# Patient Record
Sex: MALE | Race: BLACK OR AFRICAN AMERICAN | NOT HISPANIC OR LATINO | Employment: FULL TIME | URBAN - METROPOLITAN AREA
[De-identification: names, ages, dates, MRNs, and addresses within clinical notes are randomized per-mention and may not be internally consistent; named-entity substitution may affect disease eponyms.]

---

## 2020-03-13 ENCOUNTER — HOSPITAL ENCOUNTER (EMERGENCY)
Facility: HOSPITAL | Age: 50
Discharge: HOME | End: 2020-03-13
Attending: EMERGENCY MEDICINE
Payer: COMMERCIAL

## 2020-03-13 VITALS
BODY MASS INDEX: 30.74 KG/M2 | HEART RATE: 96 BPM | HEIGHT: 68 IN | WEIGHT: 202.81 LBS | TEMPERATURE: 98 F | DIASTOLIC BLOOD PRESSURE: 91 MMHG | OXYGEN SATURATION: 97 % | SYSTOLIC BLOOD PRESSURE: 183 MMHG | RESPIRATION RATE: 18 BRPM

## 2020-03-13 DIAGNOSIS — S61.002A AVULSION OF SKIN OF LEFT THUMB, INITIAL ENCOUNTER: Primary | ICD-10-CM

## 2020-03-13 PROCEDURE — 99284 EMERGENCY DEPT VISIT MOD MDM: CPT | Mod: 25

## 2020-03-13 PROCEDURE — 90715 TDAP VACCINE 7 YRS/> IM: CPT | Performed by: REGISTERED NURSE

## 2020-03-13 PROCEDURE — 63600175 PHARM REV CODE 636 W HCPCS: Performed by: REGISTERED NURSE

## 2020-03-13 PROCEDURE — 25000003 PHARM REV CODE 250: Performed by: REGISTERED NURSE

## 2020-03-13 PROCEDURE — 90471 IMMUNIZATION ADMIN: CPT | Performed by: REGISTERED NURSE

## 2020-03-13 RX ORDER — HYDROCODONE BITARTRATE AND ACETAMINOPHEN 5; 325 MG/1; MG/1
1 TABLET ORAL
Status: COMPLETED | OUTPATIENT
Start: 2020-03-13 | End: 2020-03-13

## 2020-03-13 RX ORDER — HYDROCODONE BITARTRATE AND ACETAMINOPHEN 5; 325 MG/1; MG/1
1 TABLET ORAL EVERY 6 HOURS PRN
Qty: 12 TABLET | Refills: 0 | OUTPATIENT
Start: 2020-03-13 | End: 2020-09-09

## 2020-03-13 RX ORDER — IBUPROFEN 800 MG/1
800 TABLET ORAL
Status: COMPLETED | OUTPATIENT
Start: 2020-03-13 | End: 2020-03-13

## 2020-03-13 RX ADMIN — BACITRACIN, NEOMYCIN, POLYMYXIN B 1 EACH: 400; 3.5; 5 OINTMENT TOPICAL at 03:03

## 2020-03-13 RX ADMIN — CLOSTRIDIUM TETANI TOXOID ANTIGEN (FORMALDEHYDE INACTIVATED), CORYNEBACTERIUM DIPHTHERIAE TOXOID ANTIGEN (FORMALDEHYDE INACTIVATED), BORDETELLA PERTUSSIS TOXOID ANTIGEN (GLUTARALDEHYDE INACTIVATED), BORDETELLA PERTUSSIS FILAMENTOUS HEMAGGLUTININ ANTIGEN (FORMALDEHYDE INACTIVATED), BORDETELLA PERTUSSIS PERTACTIN ANTIGEN, AND BORDETELLA PERTUSSIS FIMBRIAE 2/3 ANTIGEN 0.5 ML: 5; 2; 2.5; 5; 3; 5 INJECTION, SUSPENSION INTRAMUSCULAR at 02:03

## 2020-03-13 RX ADMIN — IBUPROFEN 800 MG: 800 TABLET, FILM COATED ORAL at 02:03

## 2020-03-13 RX ADMIN — HYDROCODONE BITARTRATE AND ACETAMINOPHEN 1 TABLET: 5; 325 TABLET ORAL at 02:03

## 2020-03-13 RX ADMIN — Medication 1 ML: at 02:03

## 2020-03-13 NOTE — ED PROVIDER NOTES
Encounter Date: 3/13/2020       History     Chief Complaint   Patient presents with    Laceration     L thumb lac from tablesaw this afternoon      The history is provided by the patient.   Laceration    The incident occurred just prior to arrival. The laceration is located on the left hand. The laceration is 2 cm in size. Injury mechanism: table saw. The pain has been constant since onset. He reports no foreign bodies present. His tetanus status is out of date.     Review of patient's allergies indicates:   Allergen Reactions    Pcn [penicillins]      Past Medical History:   Diagnosis Date    Allergy      No past surgical history on file.  Family History   Problem Relation Age of Onset    Hypertension Mother      Social History     Tobacco Use    Smoking status: Current Every Day Smoker   Substance Use Topics    Alcohol use: Yes    Drug use: Not on file     Review of Systems   Constitutional: Negative for fever.   HENT: Negative for sore throat.    Respiratory: Negative for shortness of breath.    Cardiovascular: Negative for chest pain.   Gastrointestinal: Negative for nausea.   Genitourinary: Negative for dysuria.   Musculoskeletal: Negative for back pain.        + Laceration L thumb   Skin: Negative for rash.   Neurological: Negative for weakness.   Hematological: Does not bruise/bleed easily.   All other systems reviewed and are negative.      Physical Exam     Initial Vitals [03/13/20 1356]   BP Pulse Resp Temp SpO2   (!) 183/91 96 18 98.2 °F (36.8 °C) 97 %      MAP       --         Physical Exam    Constitutional: He appears well-developed and well-nourished. No distress.   HENT:   Head: Normocephalic and atraumatic.   Nose: Nose normal.   Mouth/Throat: Uvula is midline and oropharynx is clear and moist.   Eyes: Conjunctivae and EOM are normal. Pupils are equal, round, and reactive to light.   Neck: Normal range of motion. Neck supple.   Cardiovascular: Normal rate and regular rhythm.    Pulmonary/Chest: Effort normal and breath sounds normal. No respiratory distress. He has no decreased breath sounds. He has no wheezes. He has no rales.   Abdominal: Soft. Normal appearance and bowel sounds are normal. There is no tenderness.   Musculoskeletal: Normal range of motion.        Hands:  Skin avulsion to distal pad of L thumb, nailbed intact, FROM, NVI, ttp, bleeding controlled'   Neurological: He is alert and oriented to person, place, and time. He has normal strength. GCS eye subscore is 4. GCS verbal subscore is 5. GCS motor subscore is 6.   Skin: Skin is warm and dry. Capillary refill takes less than 2 seconds. No rash noted.   Psychiatric: He has a normal mood and affect. His speech is normal and behavior is normal.         ED Course   Procedures  Labs Reviewed - No data to display       Imaging Results          X-Ray Finger 2 or More Views Left (Final result)  Result time 03/13/20 14:31:13    Final result by Faisal Ortega MD (03/13/20 14:31:13)                 Impression:      No acute fracture or dislocation.  Soft tissue injury at the distal aspect of the 1st digit.      Electronically signed by: Faisal Ortega MD  Date:    03/13/2020  Time:    14:31             Narrative:    EXAMINATION:  XR FINGER 2 OR MORE VIEWS LEFT    CLINICAL HISTORY:  XR FINGER 2 OR MORE VIEWS LEFT    COMPARISON:  None    FINDINGS:  Three views of the  were obtained.    No evidence of acute fracture or dislocation.  Bony mineralization is normal.  Soft tissue injury at the distal aspect of the 1st digit.  No foreign body.                                         I discussed wound care precautions with patient and/or family/caretaker; specifically that all wounds have risk of infection despite efforts to cleanse and debride the wound; and there is a risk of an occult foreign body (and thus increased risk of infection) despite a negative examination.  I discussed with patient need to return for any signs of infection,  specifically redness, increased pain, fever, drainage of pus, or any concern, immediately.                           Clinical Impression:       ICD-10-CM ICD-9-CM   1. Avulsion of skin of left thumb, initial encounter S61.002A 883.0                                Philip Jhaveri Jr., FNP  03/13/20 1846

## 2020-06-28 ENCOUNTER — HOSPITAL ENCOUNTER (EMERGENCY)
Facility: HOSPITAL | Age: 50
Discharge: HOME | End: 2020-06-28
Attending: FAMILY MEDICINE
Payer: OTHER GOVERNMENT

## 2020-06-28 VITALS
BODY MASS INDEX: 31.31 KG/M2 | HEART RATE: 70 BPM | DIASTOLIC BLOOD PRESSURE: 91 MMHG | HEIGHT: 67 IN | WEIGHT: 199.5 LBS | TEMPERATURE: 99 F | SYSTOLIC BLOOD PRESSURE: 172 MMHG | RESPIRATION RATE: 18 BRPM | OXYGEN SATURATION: 99 %

## 2020-06-28 DIAGNOSIS — I10 HYPERTENSION, UNSPECIFIED TYPE: ICD-10-CM

## 2020-06-28 DIAGNOSIS — H10.32 ACUTE BACTERIAL CONJUNCTIVITIS OF LEFT EYE: Primary | ICD-10-CM

## 2020-06-28 DIAGNOSIS — J02.9 PHARYNGITIS, UNSPECIFIED ETIOLOGY: ICD-10-CM

## 2020-06-28 DIAGNOSIS — H66.92 LEFT ACUTE OTITIS MEDIA: ICD-10-CM

## 2020-06-28 DIAGNOSIS — H92.02 ACUTE OTALGIA, LEFT: ICD-10-CM

## 2020-06-28 LAB
HIV 1+2 AB+HIV1 P24 AG SERPL QL IA: NEGATIVE
SARS-COV-2 RDRP RESP QL NAA+PROBE: NEGATIVE

## 2020-06-28 PROCEDURE — 25000003 PHARM REV CODE 250: Performed by: NURSE PRACTITIONER

## 2020-06-28 PROCEDURE — U0002 COVID-19 LAB TEST NON-CDC: HCPCS

## 2020-06-28 PROCEDURE — 86703 HIV-1/HIV-2 1 RESULT ANTBDY: CPT

## 2020-06-28 PROCEDURE — 99284 EMERGENCY DEPT VISIT MOD MDM: CPT

## 2020-06-28 RX ORDER — TRAMADOL HYDROCHLORIDE 50 MG/1
50 TABLET ORAL EVERY 6 HOURS PRN
Qty: 10 TABLET | Refills: 0 | Status: SHIPPED | OUTPATIENT
Start: 2020-06-28 | End: 2020-07-08

## 2020-06-28 RX ORDER — ERYTHROMYCIN 5 MG/G
OINTMENT OPHTHALMIC
Qty: 1 TUBE | Refills: 0 | Status: SHIPPED | OUTPATIENT
Start: 2020-06-28

## 2020-06-28 RX ORDER — ERYTHROMYCIN 5 MG/G
OINTMENT OPHTHALMIC
Status: COMPLETED | OUTPATIENT
Start: 2020-06-28 | End: 2020-06-28

## 2020-06-28 RX ORDER — AZITHROMYCIN 250 MG/1
250 TABLET, FILM COATED ORAL DAILY
Qty: 6 TABLET | Refills: 0 | Status: SHIPPED | OUTPATIENT
Start: 2020-06-28 | End: 2020-07-03

## 2020-06-28 RX ADMIN — ERYTHROMYCIN 1 INCH: 5 OINTMENT OPHTHALMIC at 09:06

## 2020-06-28 NOTE — Clinical Note
"Molly "Teo Monge was seen and treated in our emergency department on 6/28/2020.     COVID-19 is present in our communities across the state. There is limited testing for COVID at this time, so not all patients can be tested. In this situation, your employee meets the following criteria:    Molly Monge has met the criteria for COVID-19 testing and has a NEGATIVE result. The employee can return to work once they are asymptomatic for 72 hours without the use of fever reducing medications (Tylenol, Motrin, etc).     If you have any questions or concerns, or if I can be of further assistance, please do not hesitate to contact me.    Sincerely,             Latonya Almazan NP"

## 2020-06-29 NOTE — ED PROVIDER NOTES
History      Chief Complaint   Patient presents with    Eye Drainage     swollen left eye with some drainage, left ear pain and sore throat xfew days    Otalgia       Review of patient's allergies indicates:   Allergen Reactions    Pcn [penicillins]         HPI   HPI    6/28/2020, 8:15 PM   History obtained from the patient     History of Present Illness: Molly Monge is 49 y.o. male patient with PMHx HTN who presents to the Emergency Department for evaluation of left eye redness and drainage, sore throat, left ear pain with onset 3 days ago. Pt reports awakes in morning with crusted over left eye. The pt symptoms are constant and moderate in severity. Associated sxs include left eye drainage. Patient denies any change in vision, fever, drooling,change in voice, HA, body aches, SOB, chest pain, n/v/d, abdominal pain, loss of smell or taste, dizziness, weaknss, and all other sxs at this time. Pt reports unknown if any Covid 19 exposure. Prior Tx includes ibuprofen with no relief of ear and throat pain. No further complaints or concerns at this time.     Arrival mode: Personal vehicle      PCP: Primary Doctor No       Past Medical History:  Past Medical History:   Diagnosis Date    Allergy     Hypertension        Past Surgical History:  No past surgical history on file.      Family History:  Family History   Problem Relation Age of Onset    Hypertension Mother        Social History:  Social History     Tobacco Use    Smoking status: Current Every Day Smoker   Substance and Sexual Activity    Alcohol use: Yes    Drug use: Not on file    Sexual activity: Not on file       ROS   Review of Systems   Constitutional: Negative for chills, fatigue and fever.   HENT: Positive for ear pain and sore throat. Negative for congestion, drooling, ear discharge, postnasal drip, rhinorrhea, sinus pain, trouble swallowing and voice change.    Eyes: Positive for pain, discharge and redness. Negative for photophobia, itching  and visual disturbance.   Respiratory: Negative for cough, shortness of breath and wheezing.    Cardiovascular: Negative for chest pain.   Gastrointestinal: Negative for abdominal pain, diarrhea, nausea and vomiting.   Genitourinary: Negative for difficulty urinating, dysuria and flank pain.   Musculoskeletal: Negative for myalgias.   Skin: Negative for rash and wound.   Neurological: Negative for dizziness, syncope, weakness and headaches.       Physical Exam      Initial Vitals [06/28/20 1955]   BP Pulse Resp Temp SpO2   (!) 185/102 74 17 98.5 °F (36.9 °C) 99 %      MAP       --          Physical Exam   Constitutional: He is oriented to person, place, and time. He appears well-developed and well-nourished. No distress.   HENT:   Head: Normocephalic and atraumatic.   Right Ear: Hearing, tympanic membrane, external ear and ear canal normal.   Left Ear: Hearing normal. There is tenderness. No drainage. Tympanic membrane is erythematous. A middle ear effusion is present.   Nose: Nose normal.   Mouth/Throat: Mucous membranes are normal. Posterior oropharyngeal erythema present. No oropharyngeal exudate, posterior oropharyngeal edema or tonsillar abscesses.   Eyes: Pupils are equal, round, and reactive to light. EOM are normal. Left eye exhibits discharge. Left eye exhibits no exudate. No foreign body present in the left eye. Left conjunctiva is injected. Left conjunctiva has no hemorrhage.   Neck: Normal range of motion. Neck supple. No neck rigidity. Normal range of motion present.   Cardiovascular: Normal rate, regular rhythm and normal heart sounds.   Pulmonary/Chest: Effort normal and breath sounds normal. No respiratory distress. He exhibits no tenderness.   Abdominal: Soft. Bowel sounds are normal. There is no abdominal tenderness. There is no rigidity, no rebound and no guarding.   Musculoskeletal: Normal range of motion.   Neurological: He is alert and oriented to person, place, and time.   Skin: Skin is warm  "and dry.       Nursing Notes and Vital Signs Reviewed.    ED Course    Procedures  ED Vital Signs:  Vitals:    06/28/20 1955 06/28/20 2146   BP: (!) 185/102 (!) 172/91   Pulse: 74 70   Resp: 17 18   Temp: 98.5 °F (36.9 °C) 98.6 °F (37 °C)   TempSrc: Oral Oral   SpO2: 99% 99%   Weight: 90.5 kg (199 lb 8.3 oz)    Height: 5' 7" (1.702 m)        Abnormal Lab Results:  Labs Reviewed   HIV 1 / 2 ANTIBODY   SARS-COV-2 RNA AMPLIFICATION, QUAL        All Lab Results:  Results for orders placed or performed during the hospital encounter of 06/28/20   HIV 1/2 Ag/Ab (4th Gen)   Result Value Ref Range    HIV 1/2 Ag/Ab Negative Negative   COVID-19 Rapid Screening   Result Value Ref Range    SARS-CoV-2 RNA, Amplification, Qual Negative Negative         Imaging Results:  Imaging Results    None                       [unfilled]    The Emergency Provider reviewed the vital signs and test results, which are outlined above.    ED Discussion     2120: I discussed with patient that their Co-Vid 19 swab was negative.  Patient is stable nontoxic and ready for discharge home.  The pt denies any Fever, HA, N/V/D, SOB, chest pain, abdominal pain, dizziness, or any weakness at present time. I informed the pt of discharge instructions, will be prescribing discharge medications, and recommended they return if symptoms worsen or for any concerns.  Counseled pt at length to continue infection control precautions like covering her mouth when coughing, washing hands frequently, and minimizing contact with others whenever possible, and following all current CDC and state recommendations.     I informed pt of signs and symptoms of when to immediately return to ER which include but not limited to fever greater than 100.4, worsening cough, shortness of breath, chest pain, abdominal pain, vomiting, inability to tolerate anything by mouth, fatigue, dizziness, weakness, or any concerns.  The patient verbalized agreement understanding of treatment discharge " plan.  All questions were answered.  The patient informed to use resources that were given in discharge instructions to help him find a primary care doctor.  Patient informed to follow-up with a primary care doctor or return to emergency room for any concerns.  Patient is stable for discharge home.    I discussed with patient and/or family/caretaker that evaluation in the ED does not suggest any emergent or life threatening medical conditions requiring immediate intervention beyond what was provided in the ED, and I believe patient is safe for discharge.  Regardless, an unremarkable evaluation in the ED does not preclude the development or presence of a serious of life threatening condition. As such, patient was instructed to return immediately for any worsening or change in current symptoms.      ED Medication(s):  Medications   erythromycin 5 mg/gram (0.5 %) ophthalmic ointment (1 inch Left Eye Given 6/28/20 2137)     Discharge Medication List as of 6/28/2020  9:32 PM      START taking these medications    Details   azithromycin (Z-JOSIAS) 250 MG tablet Take 1 tablet (250 mg total) by mouth once daily. Take 2 tablets on day one, then take one tablet every day for 5 days, Starting Sun 6/28/2020, Until Fri 7/3/2020, Print      erythromycin (ROMYCIN) ophthalmic ointment Place a 1/2 inch ribbon of ointment into the right lower eyelid 4 times a day for 5 days, Print      traMADoL (ULTRAM) 50 mg tablet Take 1 tablet (50 mg total) by mouth every 6 (six) hours as needed for Pain., Starting Sun 6/28/2020, Until Wed 7/8/2020, Print            Follow-up Information     [facility] In 2 days.    Specialty: Ophthalmology  Why: Follow-up with ophthalmologist of your choice for eye exam  Contact information:  [address] [facility] [address] [phone]  Additional information:  (off [facility]) 2nd floor                     Medical Decision Making                     Clinical Impression        ICD-10-CM ICD-9-CM   1. Acute bacterial conjunctivitis of left eye  H10.32 372.03   2. Pharyngitis, unspecified etiology  J02.9 462   3. Acute otalgia, left  H92.02 388.70   4. Left acute otitis media  H66.92 382.9   5. Hypertension, unspecified type  I10 401.9       Disposition:   Disposition: Discharged  Condition: Stable         Latonya Almazan NP  07/01/20 0264

## 2020-06-29 NOTE — DISCHARGE INSTRUCTIONS
Return to ED for any or Increase in eye pain, redness, warmth, Drainage of blood or thick pus from the stye, Inability to open the eyelid due to swelling, No improvement in symptoms after 48 to 72 hours after starting medications, Fever above 100.5, Vision changes, Headache or stiff neck, or any concerns.     Take tylenol or motrin for any pain or fever above 100.4.  Follow label directions.

## 2020-06-29 NOTE — ED NOTES
Patient identifiers verified and correct for Molly Monge.    Pt reports having pink eye. Pts L eye noted to be red and swollen and watery. Pt also c/o L ear pain and sore throat.     LOC: The patient is awake, alert and aware of environment with an appropriate affect, the patient is oriented x 3 and speaking appropriately.  APPEARANCE: Patient resting comfortably and in no acute distress, patient is clean and well groomed, patient's clothing is properly fastened.  SKIN: The skin is warm and dry, color consistent with ethnicity, patient has normal skin turgor and moist mucus membranes, skin intact, no breakdown or bruising noted.  MUSCULOSKELETAL: Patient moving all extremities spontaneously.  RESPIRATORY: Airway is open and patent, respirations are spontaneous.  CARDIAC: Patient has a normal rate, no periphreal edema noted, capillary refill < 3 seconds.  ABDOMEN: Soft and non tender to palpation.

## 2020-09-09 ENCOUNTER — HOSPITAL ENCOUNTER (EMERGENCY)
Facility: HOSPITAL | Age: 50
Discharge: HOME | End: 2020-09-09
Attending: EMERGENCY MEDICINE
Payer: OTHER GOVERNMENT

## 2020-09-09 VITALS
TEMPERATURE: 99 F | DIASTOLIC BLOOD PRESSURE: 90 MMHG | SYSTOLIC BLOOD PRESSURE: 161 MMHG | HEART RATE: 68 BPM | OXYGEN SATURATION: 99 % | RESPIRATION RATE: 17 BRPM

## 2020-09-09 DIAGNOSIS — Z72.0 TOBACCO ABUSE: Primary | ICD-10-CM

## 2020-09-09 DIAGNOSIS — R41.82 ALTERED MENTAL STATUS: ICD-10-CM

## 2020-09-09 DIAGNOSIS — R07.89 ATYPICAL CHEST PAIN: ICD-10-CM

## 2020-09-09 DIAGNOSIS — R07.9 CHEST PAIN: ICD-10-CM

## 2020-09-09 DIAGNOSIS — F43.20 ADJUSTMENT DISORDER, UNSPECIFIED TYPE: ICD-10-CM

## 2020-09-09 LAB
ALBUMIN SERPL BCP-MCNC: 4.1 G/DL (ref 3.5–5.2)
ALP SERPL-CCNC: 74 U/L (ref 55–135)
ALT SERPL W/O P-5'-P-CCNC: 20 U/L (ref 10–44)
AMPHET+METHAMPHET UR QL: NEGATIVE
ANION GAP SERPL CALC-SCNC: 11 MMOL/L (ref 8–16)
APAP SERPL-MCNC: <3 UG/ML (ref 10–20)
APTT BLDCRRT: 28.1 SEC (ref 21–32)
AST SERPL-CCNC: 19 U/L (ref 10–40)
BARBITURATES UR QL SCN>200 NG/ML: NEGATIVE
BASOPHILS # BLD AUTO: 0.03 K/UL (ref 0–0.2)
BASOPHILS NFR BLD: 0.4 % (ref 0–1.9)
BENZODIAZ UR QL SCN>200 NG/ML: NEGATIVE
BILIRUB SERPL-MCNC: 0.3 MG/DL (ref 0.1–1)
BILIRUB UR QL STRIP: NEGATIVE
BUN SERPL-MCNC: 13 MG/DL (ref 6–20)
BZE UR QL SCN: NEGATIVE
CALCIUM SERPL-MCNC: 9.5 MG/DL (ref 8.7–10.5)
CANNABINOIDS UR QL SCN: NEGATIVE
CHLORIDE SERPL-SCNC: 103 MMOL/L (ref 95–110)
CLARITY UR: CLEAR
CO2 SERPL-SCNC: 24 MMOL/L (ref 23–29)
COLOR UR: YELLOW
CREAT SERPL-MCNC: 1 MG/DL (ref 0.5–1.4)
CREAT UR-MCNC: 28.7 MG/DL (ref 23–375)
DIFFERENTIAL METHOD: ABNORMAL
EOSINOPHIL # BLD AUTO: 0.2 K/UL (ref 0–0.5)
EOSINOPHIL NFR BLD: 2.7 % (ref 0–8)
ERYTHROCYTE [DISTWIDTH] IN BLOOD BY AUTOMATED COUNT: 16.3 % (ref 11.5–14.5)
EST. GFR  (AFRICAN AMERICAN): >60 ML/MIN/1.73 M^2
EST. GFR  (NON AFRICAN AMERICAN): >60 ML/MIN/1.73 M^2
ETHANOL SERPL-MCNC: <10 MG/DL
GLUCOSE SERPL-MCNC: 104 MG/DL (ref 70–110)
GLUCOSE UR QL STRIP: NEGATIVE
HCT VFR BLD AUTO: 43.7 % (ref 40–54)
HGB BLD-MCNC: 14 G/DL (ref 14–18)
HGB UR QL STRIP: ABNORMAL
IMM GRANULOCYTES # BLD AUTO: 0.02 K/UL (ref 0–0.04)
IMM GRANULOCYTES NFR BLD AUTO: 0.3 % (ref 0–0.5)
INR PPP: 0.9 (ref 0.8–1.2)
KETONES UR QL STRIP: NEGATIVE
LEUKOCYTE ESTERASE UR QL STRIP: NEGATIVE
LYMPHOCYTES # BLD AUTO: 2.1 K/UL (ref 1–4.8)
LYMPHOCYTES NFR BLD: 28.9 % (ref 18–48)
MAGNESIUM SERPL-MCNC: 2.1 MG/DL (ref 1.6–2.6)
MCH RBC QN AUTO: 24.5 PG (ref 27–31)
MCHC RBC AUTO-ENTMCNC: 32 G/DL (ref 32–36)
MCV RBC AUTO: 77 FL (ref 82–98)
METHADONE UR QL SCN>300 NG/ML: NEGATIVE
MONOCYTES # BLD AUTO: 1.1 K/UL (ref 0.3–1)
MONOCYTES NFR BLD: 14.6 % (ref 4–15)
NEUTROPHILS # BLD AUTO: 3.9 K/UL (ref 1.8–7.7)
NEUTROPHILS NFR BLD: 53.1 % (ref 38–73)
NITRITE UR QL STRIP: NEGATIVE
NRBC BLD-RTO: 0 /100 WBC
OPIATES UR QL SCN: NEGATIVE
PCP UR QL SCN>25 NG/ML: NEGATIVE
PH UR STRIP: 6 [PH] (ref 5–8)
PLATELET # BLD AUTO: 309 K/UL (ref 150–350)
PMV BLD AUTO: 8.7 FL (ref 9.2–12.9)
POCT GLUCOSE: 115 MG/DL (ref 70–110)
POTASSIUM SERPL-SCNC: 4.4 MMOL/L (ref 3.5–5.1)
PROT SERPL-MCNC: 8.1 G/DL (ref 6–8.4)
PROT UR QL STRIP: NEGATIVE
PROTHROMBIN TIME: 10.1 SEC (ref 9–12.5)
RBC # BLD AUTO: 5.71 M/UL (ref 4.6–6.2)
SALICYLATES SERPL-MCNC: <5 MG/DL (ref 15–30)
SODIUM SERPL-SCNC: 138 MMOL/L (ref 136–145)
SP GR UR STRIP: <=1.005 (ref 1–1.03)
TOXICOLOGY INFORMATION: NORMAL
TROPONIN I SERPL DL<=0.01 NG/ML-MCNC: 0.01 NG/ML (ref 0–0.03)
TROPONIN I SERPL DL<=0.01 NG/ML-MCNC: <0.006 NG/ML (ref 0–0.03)
TROPONIN I SERPL DL<=0.01 NG/ML-MCNC: <0.006 NG/ML (ref 0–0.03)
URN SPEC COLLECT METH UR: ABNORMAL
UROBILINOGEN UR STRIP-ACNC: NEGATIVE EU/DL
WBC # BLD AUTO: 7.4 K/UL (ref 3.9–12.7)

## 2020-09-09 PROCEDURE — 83735 ASSAY OF MAGNESIUM: CPT

## 2020-09-09 PROCEDURE — 80329 ANALGESICS NON-OPIOID 1 OR 2: CPT

## 2020-09-09 PROCEDURE — 87077 CULTURE AEROBIC IDENTIFY: CPT

## 2020-09-09 PROCEDURE — 81003 URINALYSIS AUTO W/O SCOPE: CPT | Mod: 59

## 2020-09-09 PROCEDURE — 96374 THER/PROPH/DIAG INJ IV PUSH: CPT | Mod: 59

## 2020-09-09 PROCEDURE — C9113 INJ PANTOPRAZOLE SODIUM, VIA: HCPCS | Performed by: EMERGENCY MEDICINE

## 2020-09-09 PROCEDURE — 63600175 PHARM REV CODE 636 W HCPCS: Performed by: EMERGENCY MEDICINE

## 2020-09-09 PROCEDURE — 85730 THROMBOPLASTIN TIME PARTIAL: CPT

## 2020-09-09 PROCEDURE — 80053 COMPREHEN METABOLIC PANEL: CPT

## 2020-09-09 PROCEDURE — 93010 ELECTROCARDIOGRAM REPORT: CPT | Mod: 76,,, | Performed by: INTERNAL MEDICINE

## 2020-09-09 PROCEDURE — 84484 ASSAY OF TROPONIN QUANT: CPT | Mod: 91

## 2020-09-09 PROCEDURE — 36415 COLL VENOUS BLD VENIPUNCTURE: CPT

## 2020-09-09 PROCEDURE — 80320 DRUG SCREEN QUANTALCOHOLS: CPT

## 2020-09-09 PROCEDURE — G0425 INPT/ED TELECONSULT30: HCPCS | Mod: 95,,, | Performed by: NURSE PRACTITIONER

## 2020-09-09 PROCEDURE — 82962 GLUCOSE BLOOD TEST: CPT

## 2020-09-09 PROCEDURE — 93005 ELECTROCARDIOGRAM TRACING: CPT

## 2020-09-09 PROCEDURE — 99285 EMERGENCY DEPT VISIT HI MDM: CPT | Mod: 25

## 2020-09-09 PROCEDURE — 80307 DRUG TEST PRSMV CHEM ANLYZR: CPT

## 2020-09-09 PROCEDURE — 25500020 PHARM REV CODE 255: Performed by: EMERGENCY MEDICINE

## 2020-09-09 PROCEDURE — 25000003 PHARM REV CODE 250: Performed by: EMERGENCY MEDICINE

## 2020-09-09 PROCEDURE — 87186 SC STD MICRODIL/AGAR DIL: CPT

## 2020-09-09 PROCEDURE — G0425 PR INPT TELEHEALTH CONSULT 30M: ICD-10-PCS | Mod: 95,,, | Performed by: NURSE PRACTITIONER

## 2020-09-09 PROCEDURE — 87040 BLOOD CULTURE FOR BACTERIA: CPT

## 2020-09-09 PROCEDURE — 85610 PROTHROMBIN TIME: CPT

## 2020-09-09 PROCEDURE — 85025 COMPLETE CBC W/AUTO DIFF WBC: CPT

## 2020-09-09 PROCEDURE — 93010 EKG 12-LEAD: ICD-10-PCS | Mod: ,,, | Performed by: INTERNAL MEDICINE

## 2020-09-09 RX ORDER — PANTOPRAZOLE SODIUM 40 MG/1
40 TABLET, DELAYED RELEASE ORAL DAILY
Qty: 30 TABLET | Refills: 0 | Status: SHIPPED | OUTPATIENT
Start: 2020-09-09 | End: 2020-10-09

## 2020-09-09 RX ORDER — BUTALBITAL, ACETAMINOPHEN AND CAFFEINE 50; 325; 40 MG/1; MG/1; MG/1
1 TABLET ORAL EVERY 6 HOURS PRN
Qty: 16 TABLET | Refills: 0 | Status: SHIPPED | OUTPATIENT
Start: 2020-09-09 | End: 2020-10-09

## 2020-09-09 RX ORDER — HYDROXYZINE PAMOATE 25 MG/1
25 CAPSULE ORAL EVERY 6 HOURS PRN
Qty: 16 CAPSULE | Refills: 0 | Status: SHIPPED | OUTPATIENT
Start: 2020-09-09

## 2020-09-09 RX ORDER — PANTOPRAZOLE SODIUM 40 MG/10ML
40 INJECTION, POWDER, LYOPHILIZED, FOR SOLUTION INTRAVENOUS
Status: COMPLETED | OUTPATIENT
Start: 2020-09-09 | End: 2020-09-09

## 2020-09-09 RX ADMIN — IOHEXOL 100 ML: 350 INJECTION, SOLUTION INTRAVENOUS at 02:09

## 2020-09-09 RX ADMIN — DICYCLOMINE HYDROCHLORIDE 50 ML: 10 SOLUTION ORAL at 01:09

## 2020-09-09 RX ADMIN — PANTOPRAZOLE SODIUM 40 MG: 40 INJECTION, POWDER, LYOPHILIZED, FOR SOLUTION INTRAVENOUS at 01:09

## 2020-09-09 NOTE — ED NOTES
Pt states that he is feeling a little better after the GI cocktail and Protonix. States he is still having a HA

## 2020-09-09 NOTE — ED NOTES
Upon arrival in er room, pt not very responsive, just kept asking why why why. Within 10 mins of arrival, pt became responsive and able to answer questions appropriately and oriented. Pt c/o chest pain that goes into his shoulder and states his head hurts very badly. States that the light is too bright. Pt able to use urinal to get urine at this point as well. Pt aware of surroundings and past events as well. Was able to inform me that he and his wife  a month ago and the wife stated the same thing to the dr. Pt denies ETOH or drug use. States last time had ETOH was Saturday. States he ate breakfast this morning as well. Pt just kept stating he wanted to go back to sleep. Can follow commands as well. Will continue to monitor pt at this time

## 2020-09-09 NOTE — CONSULTS
"[facility]  Psychiatry  Telepsychiatry Consult Note    Please see previous notes:    Patient agreeable to consultation via telepsychiatry.    Tele-Consultation from Psychiatry started: 9/9/2020 at 1525  The chief complaint leading to psychiatric consultation is: overdose, took 4 tablets of Ibuprofen PM.  Denies si, but going through separationf from wife  This consultation was requested by Marzena Fan DO, the Emergency Department attending physician.  The location of the consulting psychiatrist is [address] [place] [address].  The patient location is  [facility] EMERGENCY DEPARTMENT     Patient Identification:   Molly Monge is a 50 y.o. male.    Patient information was obtained from patient.    Inpatient consult to Telemedicine - Psyc  Consult performed by: Reji Kilgore III, NP  Consult ordered by: Marzena Fan DO        Subjective:     History of Present Illness:    Ped ED HPI: "Molly Monge is a 50 y.o. male patient with a PMHx of HTN who presents to the Emergency Department for evaluation of altered mental status. [facility] reports that pt was found unresponsive on the couch, dressed in Amish clothing, with a CBG of 99. Last known well time was 4 days ago. Here in the ED, pt's wife reports that she and the pt  1 month ago. She states that he "needs to be tested for everything" because there may be "illegal drugs in his system" including crack cocaine. Pt's wife further reports that she once found a business card with a "white, hard powdery substance" folded inside in the pt's belongings. Pt's wife received a call from pt's brother, Kevin, stating that he hadn't talked to the pt in a couple of days. When pt's brother arrived at the scene, pt was unresponsive, appeared gray, and was dressed in Amish clothing, per wife. Symptoms are constant and moderate in severity. No mitigating or exacerbating factors reported. Prior Tx includes Narcan with no effect. Here " "in the ED, pt repeatedly asks "why, why, why" during initial assessment".    Psychiatric Interview:  Pt reports that he took Advil PM for headache and was sedated when family checked on him. Denies any SI past or present. Pt verbally contracts for safety.  Thought processes appear clear and organized. Denies SI/HI/AVH. Mood appears stable.     Psychiatric History:   Previous Psychiatric Hospitalizations: No   Previous Medication Trials: no  Previous Suicide Attempts: no  History of Violence: no  History of Depression: no  History of Kaylie: no  History of Auditory/Visual Hallucination no  History of Delusions: no  Outpatient psychiatrist (current & past): No    Substance Abuse History:  Tobacco:No  Alcohol: No  Illicit Substances:No  Detox/Rehab: No    Legal History: Past charges/incarcerations: No     Family Psychiatric History: no  Employment Status/Finances:Employed   Relationship Status/Sexual Orientation: :  Relationship cutoff  Housing Status: Home     Psychiatric Mental Status Exam:  Arousal: alert  Sensorium/Orientation: oriented to grossly intact  Behavior/Cooperation: normal, cooperative   Speech: normal tone, normal rate, normal pitch, normal volume  Language: grossly intact  Mood: " okay "   Affect: appropriate  Thought Process: normal and logical  Thought Content:   Auditory hallucinations: NO  Visual hallucinations: NO  Paranoia: NO  Delusions:  NO  Suicidal ideation: NO  Homicidal ideation: NO  Attention/Concentration:  intact  Memory:    Recent:  Intact   Remote: Intact   3/3 immediate, 2/3 at 5 min  Fund of Knowledge: Intact   Abstract reasoning: proverbs were abstract  Insight: intact  Judgment: behavior is adequate to circumstances      Past Medical History:   Past Medical History:   Diagnosis Date    Allergy     Hypertension       Laboratory Data:   Labs Reviewed   CBC W/ AUTO DIFFERENTIAL - Abnormal; Notable for the following components:       Result Value    Mean Corpuscular Volume 77 " (*)     Mean Corpuscular Hemoglobin 24.5 (*)     RDW 16.3 (*)     MPV 8.7 (*)     Mono # 1.1 (*)     All other components within normal limits   URINALYSIS, REFLEX TO URINE CULTURE - Abnormal; Notable for the following components:    Specific Gravity, UA <=1.005 (*)     Occult Blood UA Trace (*)     All other components within normal limits    Narrative:     Specimen Source->Urine   SALICYLATE LEVEL - Abnormal; Notable for the following components:    Salicylate Lvl <5.0 (*)     All other components within normal limits   ACETAMINOPHEN LEVEL - Abnormal; Notable for the following components:    Acetaminophen (Tylenol), Serum <3.0 (*)     All other components within normal limits   POCT GLUCOSE - Abnormal; Notable for the following components:    POCT Glucose 115 (*)     All other components within normal limits   CULTURE, BLOOD   CULTURE, BLOOD   COMPREHENSIVE METABOLIC PANEL   MAGNESIUM   APTT   PROTIME-INR   TROPONIN I   ALCOHOL,MEDICAL (ETHANOL)   DRUG SCREEN PANEL, URINE EMERGENCY    Narrative:     Specimen Source->Urine   TROPONIN I   TROPONIN I       Neurological History:  Seizures: No  Head trauma: No    Allergies:   Review of patient's allergies indicates:   Allergen Reactions    Pcn [penicillins]        Medications in ER:   Medications   GI cocktail (mylanta 30 mL, lidocaine 2 % viscous 10 mL, dicyclomine 10 mL) 50 mL (50 mLs Oral Given 9/9/20 1351)   pantoprazole injection 40 mg (40 mg Intravenous Given 9/9/20 1351)   iohexoL (OMNIPAQUE 350) injection 100 mL (100 mLs Intravenous Given 9/9/20 1425)       Medications at home: none    No new subjective & objective note has been filed under this hospital service since the last note was generated.      Assessment - Diagnosis - Goals:     Diagnosis/Impression: Adjustment disorder unspecified type    Rec: Pt cleared by psych for discharge. Pt does not meet criteria for PEC or involuntary inpatient treatment. No med recs at this time. F/U with outpatient as  needed.     Time with patient: 30 minutes    More than 50% of the time was spent counseling/coordinating care    Consulting clinician was informed of the encounter and consult note.    Consultation ended: 9/9/2020 at: 4198    Reji Kilgore III, NP   Psychiatry  [facility]

## 2020-09-09 NOTE — ED NOTES
Pt cousin came to this RN and asked for updates of pt. States that if pt goes home, he will be the one that is caring for pt at this time. States that he is the one that came and checked on the pt today and called [facility]. Kevin Minaya, cousin, phone number is [phone]. He asked to stay informed with pt status.

## 2020-09-09 NOTE — DISCHARGE INSTRUCTIONS
Avoid red sauces, caffeine, peppermint, peppers,  alcohol, motrin/ibuprofen/advil/NSAIDS, and tobacco.      Eat a bland diet.    Return to the ED for:   Blood in stool, black tarry stool, dizziness, light-headedness, worsening pain, passing out, vomiting blood or other concerns.       Take medications only as prescribed.    Stop taking Motrin.

## 2020-09-09 NOTE — ED PROVIDER NOTES
"SCRIBE #1 NOTE: I, Lupis García, am scribing for, and in the presence of, Marzena Fan DO. I have scribed the entire note.       History     Chief Complaint   Patient presents with    Altered Mental Status     found unresponsive on couch, last normal Saturday. Pt responds to painful stimuli. Narcan given- no effect. CBG 99     Review of patient's allergies indicates:   Allergen Reactions    Pcn [penicillins]          History of Present Illness     HPI    9/9/2020, 11:25 AM  History obtained from the patient, AASI, and wife  History limited due to mental status change.    History of Present Illness: Molly Monge is a 50 y.o. male patient with a PMHx of HTN who presents to the Emergency Department for evaluation of altered mental status. AASI reports that pt was found unresponsive on the couch, dressed in Oriental orthodox clothing, with a CBG of 99. Last known well time was 4 days ago. Here in the ED, pt's wife reports that she and the pt  1 month ago. She states that he "needs to be tested for everything" because there may be "illegal drugs in his system" including crack cocaine. Pt's wife further reports that she once found a business card with a "white, hard powdery substance" folded inside in the pt's belongings. Pt's wife received a call from pt's brother, Kevin, stating that he hadn't talked to the pt in a couple of days. When pt's brother arrived at the scene, pt was unresponsive, appeared gray, and was dressed in Oriental orthodox clothing, per wife. Symptoms are constant and moderate in severity. No mitigating or exacerbating factors reported. Prior Tx includes Narcan with no effect. Here in the ED, pt repeatedly asks "why, why, why" during initial assessment.    Arrival mode: AASI    PCP: Primary Doctor No        Past Medical History:  Past Medical History:   Diagnosis Date    Allergy     Hypertension        Past Surgical History:  History reviewed. No pertinent surgical history.       Family " "History:  Family History   Problem Relation Age of Onset    Hypertension Mother        Social History:  Social History     Tobacco Use    Smoking status: Current Every Day Smoker   Substance and Sexual Activity    Alcohol use: Yes    Drug use: Unknown    Sexual activity: Unknown        Review of Systems     Review of Systems   Unable to perform ROS: Mental status change   Psychiatric/Behavioral: Positive for confusion.      Physical Exam     Initial Vitals   BP Pulse Resp Temp SpO2   09/09/20 1118 09/09/20 1118 09/09/20 1118 09/09/20 1341 09/09/20 1118   (!) 167/102 81 18 98.6 °F (37 °C) 100 %      MAP       --                 Physical Exam   Exam limited due to patients mental status change.  Nursing Notes and Vital Signs Reviewed.  Constitutional: Patient is in no acute distress.   Head: Atraumatic. Normocephalic.  Eyes: PERRL. EOM intact. Conjunctivae are not pale. No scleral icterus.  ENT: Mucous membranes are moist. Oropharynx is clear and symmetric.  Mouth: No tongue swelling or lacerations.  Neck: Supple. Full ROM. No lymphadenopathy.  Cardiovascular: Regular rate. Regular rhythm. No murmurs, rubs, or gallops. Distal pulses are 2+ and symmetric.  Pulmonary/Chest: No respiratory distress. Clear to auscultation bilaterally. No wheezing or rales.  Abdominal: Soft and non-distended.  Good bowel sounds.  Musculoskeletal: Moves all extremities. No obvious deformities. No edema.   Skin: Warm and dry. Normal in appearance.  Neurological:  No acute focal neurological deficits are appreciated. Pt unable to lift RUE above gravity, although nurses report that he did raise his arms. Pt appears confused. Pt keeps asking "why, why, why" during exam.  Psychiatric: appears confused.      ED Course   Procedures  ED Vital Signs:  Vitals:    09/09/20 1118 09/09/20 1148 09/09/20 1202 09/09/20 1216   BP: (!) 167/102 (!) 195/88 (!) 162/72 (!) 150/74   Pulse: 81 79 82 75   Resp: 18 19 18   Temp:       TempSrc:       SpO2: 100% " 100% 97% 100%    09/09/20 1238 09/09/20 1301 09/09/20 1331 09/09/20 1341   BP: 139/81 139/74 (!) 146/73    Pulse: 73 71 78    Resp: 16 17  16   Temp:    98.6 °F (37 °C)   TempSrc:    Oral   SpO2: 100% 97% 100%     09/09/20 1401 09/09/20 1452 09/09/20 1454 09/09/20 1531   BP: (!) 152/87 (!) 160/87  (!) 157/86   Pulse: 69 74  68   Resp: 18  19 14   Temp:       TempSrc:       SpO2: 99% 99%  99%    09/09/20 1632   BP: (!) 161/90   Pulse: 68   Resp: 17   Temp:    TempSrc:    SpO2: 99%       Abnormal Lab Results:  Labs Reviewed   CBC W/ AUTO DIFFERENTIAL - Abnormal; Notable for the following components:       Result Value    Mean Corpuscular Volume 77 (*)     Mean Corpuscular Hemoglobin 24.5 (*)     RDW 16.3 (*)     MPV 8.7 (*)     Mono # 1.1 (*)     All other components within normal limits   URINALYSIS, REFLEX TO URINE CULTURE - Abnormal; Notable for the following components:    Specific Gravity, UA <=1.005 (*)     Occult Blood UA Trace (*)     All other components within normal limits    Narrative:     Specimen Source->Urine   SALICYLATE LEVEL - Abnormal; Notable for the following components:    Salicylate Lvl <5.0 (*)     All other components within normal limits   ACETAMINOPHEN LEVEL - Abnormal; Notable for the following components:    Acetaminophen (Tylenol), Serum <3.0 (*)     All other components within normal limits   POCT GLUCOSE - Abnormal; Notable for the following components:    POCT Glucose 115 (*)     All other components within normal limits   CULTURE, BLOOD   CULTURE, BLOOD   COMPREHENSIVE METABOLIC PANEL   MAGNESIUM   APTT   PROTIME-INR   TROPONIN I   ALCOHOL,MEDICAL (ETHANOL)   DRUG SCREEN PANEL, URINE EMERGENCY    Narrative:     Specimen Source->Urine   TROPONIN I   TROPONIN I        All Lab Results:  Results for orders placed or performed during the hospital encounter of 09/09/20   CBC auto differential   Result Value Ref Range    WBC 7.40 3.90 - 12.70 K/uL    RBC 5.71 4.60 - 6.20 M/uL    Hemoglobin  14.0 14.0 - 18.0 g/dL    Hematocrit 43.7 40.0 - 54.0 %    Mean Corpuscular Volume 77 (L) 82 - 98 fL    Mean Corpuscular Hemoglobin 24.5 (L) 27.0 - 31.0 pg    Mean Corpuscular Hemoglobin Conc 32.0 32.0 - 36.0 g/dL    RDW 16.3 (H) 11.5 - 14.5 %    Platelets 309 150 - 350 K/uL    MPV 8.7 (L) 9.2 - 12.9 fL    Immature Granulocytes 0.3 0.0 - 0.5 %    Gran # (ANC) 3.9 1.8 - 7.7 K/uL    Immature Grans (Abs) 0.02 0.00 - 0.04 K/uL    Lymph # 2.1 1.0 - 4.8 K/uL    Mono # 1.1 (H) 0.3 - 1.0 K/uL    Eos # 0.2 0.0 - 0.5 K/uL    Baso # 0.03 0.00 - 0.20 K/uL    nRBC 0 0 /100 WBC    Gran% 53.1 38.0 - 73.0 %    Lymph% 28.9 18.0 - 48.0 %    Mono% 14.6 4.0 - 15.0 %    Eosinophil% 2.7 0.0 - 8.0 %    Basophil% 0.4 0.0 - 1.9 %    Differential Method Automated    Comprehensive metabolic panel   Result Value Ref Range    Sodium 138 136 - 145 mmol/L    Potassium 4.4 3.5 - 5.1 mmol/L    Chloride 103 95 - 110 mmol/L    CO2 24 23 - 29 mmol/L    Glucose 104 70 - 110 mg/dL    BUN, Bld 13 6 - 20 mg/dL    Creatinine 1.0 0.5 - 1.4 mg/dL    Calcium 9.5 8.7 - 10.5 mg/dL    Total Protein 8.1 6.0 - 8.4 g/dL    Albumin 4.1 3.5 - 5.2 g/dL    Total Bilirubin 0.3 0.1 - 1.0 mg/dL    Alkaline Phosphatase 74 55 - 135 U/L    AST 19 10 - 40 U/L    ALT 20 10 - 44 U/L    Anion Gap 11 8 - 16 mmol/L    eGFR if African American >60 >60 mL/min/1.73 m^2    eGFR if non African American >60 >60 mL/min/1.73 m^2   Magnesium   Result Value Ref Range    Magnesium 2.1 1.6 - 2.6 mg/dL   Urinalysis, Reflex to Urine Culture Urine, Clean Catch    Specimen: Urine   Result Value Ref Range    Specimen UA Urine, Clean Catch     Color, UA Yellow Yellow, Straw, Dai    Appearance, UA Clear Clear    pH, UA 6.0 5.0 - 8.0    Specific Gravity, UA <=1.005 (A) 1.005 - 1.030    Protein, UA Negative Negative    Glucose, UA Negative Negative    Ketones, UA Negative Negative    Bilirubin (UA) Negative Negative    Occult Blood UA Trace (A) Negative    Nitrite, UA Negative Negative     Urobilinogen, UA Negative <2.0 EU/dL    Leukocytes, UA Negative Negative   APTT   Result Value Ref Range    aPTT 28.1 21.0 - 32.0 sec   Protime-INR   Result Value Ref Range    Prothrombin Time 10.1 9.0 - 12.5 sec    INR 0.9 0.8 - 1.2   Troponin I   Result Value Ref Range    Troponin I <0.006 0.000 - 0.026 ng/mL   Ethanol   Result Value Ref Range    Alcohol, Medical, Serum <10 <10 mg/dL   Salicylate level   Result Value Ref Range    Salicylate Lvl <5.0 (L) 15.0 - 30.0 mg/dL   Acetaminophen level   Result Value Ref Range    Acetaminophen (Tylenol), Serum <3.0 (L) 10.0 - 20.0 ug/mL   Drug screen panel, emergency   Result Value Ref Range    Benzodiazepines Negative     Methadone metabolites Negative     Cocaine (Metab.) Negative     Opiate Scrn, Ur Negative     Barbiturate Screen, Ur Negative     Amphetamine Screen, Ur Negative     THC Negative     Phencyclidine Negative     Creatinine, Random Ur 28.7 23.0 - 375.0 mg/dL    Toxicology Information SEE COMMENT    Troponin I   Result Value Ref Range    Troponin I <0.006 0.000 - 0.026 ng/mL   Troponin I   Result Value Ref Range    Troponin I 0.006 0.000 - 0.026 ng/mL   POCT glucose   Result Value Ref Range    POCT Glucose 115 (H) 70 - 110 mg/dL         Imaging Results:  Imaging Results          CTA Chest Non-Coronary (PE Study) (Final result)  Result time 09/09/20 14:43:37    Final result by Robson Bellamy MD (09/09/20 14:43:37)                 Impression:      No acute intrathoracic abnormality.  No CT evidence of pulmonary thromboembolus.    All CT scans at this facility use dose modulation, iterative reconstruction, and/or weight based dosing when appropriate to reduce radiation dose to as low as reasonable achievable.      Electronically signed by: Robson Bellamy  Date:    09/09/2020  Time:    14:43             Narrative:    EXAMINATION:  CTA CHEST NON CORONARY    CLINICAL HISTORY:  Chest pain, normal EKG;    TECHNIQUE:  Low dose axial images, sagittal and coronal  reformations were obtained from the thoracic inlet to the lung bases.  One hundred cc Omnipaque 350 intravenous contrast administered according to PE protocol..    COMPARISON:  Chest radiograph earlier same date    FINDINGS:  Base of Neck: No significant abnormality.    Aorta: Left-sided aortic arch with 3 branch vessels.  No aneurysm, dissection, and no significant atherosclerosis.    Heart: Normal size. No pericardial effusion.    Pulmonary vasculature: Optimal opacification of the pulmonary arteries.  Pulmonary arteries distribute normally.  No filling defects identified to suggest pulmonary thromboembolus.    Leilani/Mediastinum: No pathologic marcial enlargement.    Trachea and Proximal airways: Patent.    Lungs/Pleura: Symmetrically expanded without consolidation, pneumothorax, or mass.  No pleural effusion or thickening.    Esophagus: Normal course and caliber.    Upper Abdomen: No abnormality of the partially imaged upper abdomen.    Bones: No acute fracture. No suspicious lytic or sclerotic lesions.    Thoracic soft tissues: Normal.                               CT Head Without Contrast (Final result)  Result time 09/09/20 12:51:47    Final result by Robson Bellamy MD (09/09/20 12:51:47)                 Impression:      No CT evidence of acute intracranial abnormality.    All CT scans at this facility use dose modulation, iterative reconstruction, and/or weight base dosing when appropriate to reduce radiation dose to as low as reasonably achievable.      Electronically signed by: Robson Bellamy  Date:    09/09/2020  Time:    12:51             Narrative:    EXAMINATION:  CT HEAD WITHOUT CONTRAST    CLINICAL HISTORY:  Altered mental status, unspecifiedaltered mental status;    TECHNIQUE:  Contiguous axial images were obtained from the skull base through the vertex without intravenous contrast.    COMPARISON:  None    FINDINGS:  No intracranial hemorrhage. No mass effect or midline shift. Normal parenchymal  "attenuation. The ventricles and sulci are normal in size and configuration. Mild mucosal thickening of the visualized ethmoid air cells.  Remaining visualized paranasal sinuses and bilateral mastoid air cells are predominantly clear.  No concerning osseous findings.                               X-Ray Chest AP Portable (Edited Result - FINAL)  Result time 09/09/20 13:44:53    Addendum 1 of 1 by Robson Bellamy MD (09/09/20 13:44:53)      Findings: Should read NO large consolidative opacity or effusion."    Impression: No acute radiographic abnormality in the chest.      Electronically signed by: Robson Bellaym  Date:    09/09/2020  Time:    13:44               Final result by Robson Bellamy MD (09/09/20 12:03:32)                 Impression:      No acute radiographic abnormality in the chest.      Electronically signed by: Robson Bellamy  Date:    09/09/2020  Time:    12:03             Narrative:    EXAMINATION:  XR CHEST AP PORTABLE    CLINICAL HISTORY:  Chest pain, unspecified    TECHNIQUE:  Single frontal view of the chest was performed.    COMPARISON:  None    FINDINGS:  Cardiac leads project over the chest.  Cardiomediastinal silhouette is within normal limits.  Trachea is midline.  Lungs are symmetrically expanded.  Large consolidative opacity or effusion.  No pneumothorax.  Visualized osseous structures appear intact.                                 The EKG was ordered, reviewed, and independently interpreted by the ED provider.  Interpretation time: 1124  Rate: 79 BPM  Rhythm: normal sinus rhythm  Interpretation: No acute ST changes. No STEMI.    The EKG was ordered, reviewed, and independently interpreted by the ED provider.  Interpretation time: 1355  Rate: 70 BPM  Rhythm: normal sinus rhythm  Interpretation: No acute ST changes. No STEMI.  When compared to EKG performed earlier today, there are no significant changes.         The Emergency Provider reviewed the vital signs and test results, which are " "outlined above.     ED Discussion       11:30 AM: Pt is able to recognize his wife. When pt's wife, entered the room, pt repeatedly yelled, "I need her to go!" Pt's wife served as partial historian. Pt's wife's contact:  Anne Monge  [phone]    11:32 AM: Per nurse, Pt states, "My chest hurts. My head hurts. I took some Ibuprofen and I just want to sleep.     11:34 AM: Per nurse, pt is now AAOX3. He is aware that his wife left him a month ago. Pt reports that he ate breakfast this morning. He currently c/o a severe headache, photophobia, and CP the radiates to his L arm.  Pt denies any illicit drug use, and states that his last time consuming EtOh was a couple days ago.    1:24 PM: Re-evaluated pt. Pt is resting comfortably and is in no acute distress. Pt is AAOX3. Pt states that he took Benadryl PM 9.5 hours PTA (0200). He reports that he has been under a tremendous amonut of stress and has been experenscing severe headaches that onset gradually x1 month. Pt also c/o of persistent CP that has been constant x11 hours.He denies any unilateral weakness, unilateal numbness, slurred speech, fever, chill, or any SI.  Patient does smoke.  Patient reports that he has been taking lots of ibuprofen.  Patient had taken 4 tablets of ibuprofen p.m. at 3:30 a.m. this morning.  Patient reports that he had taken 4-6 ibuprofen last night.  Patient denies any suicidal ideation, homicidal ideation.  Patient reports that he has had chest discomfort continuously for weeks.  He states that it is located in the left chest.  It is described as burning.  Patient denies any nausea, vomiting, diaphoresis with that.  Patient does report that he tried a PPI that resulted in diarrhea.  He has been noncompliant with his PCI.  Patient denies any known history of heart disease, hypercholesterolemia, or diabetes.  Patient does have history of high blood pressure.  Patient also reports that he has been having headache located across the " frontal region.  It is described as throbbing.  It has been present for weeks.  It is not associated with any numbness or weakness to 1 side, slurred speech, loss of vision, or balance issues.  Is not sudden in onset or severe.  Patient now is awake, alert, oriented.  GCS is 15.  NIH Stroke Scale equals 0.  I discussed plan of care with patient including serial troponins.  Also CTA of the chest to exclude intravascular pathology.  Patient will also be evaluated by tele psychiatry    3:18 PM: Per nurse, pt is requesting food.  Patient is nontoxic appearing    3:58 PM: Reassessed pt at this time.  Patient was evaluated by tele psychiatry.  No recommendation for pec.  Patient denies any suicidal ideation or homicidal ideation.  Patient has 2 serial EKGs that do not show any dynamic changes.  Repeat troponins are normal.  CT of the chest did not show any vascular pathology.  Patient was counseled on smoking.  Patient also was counseled on dietary changes.  Patient does not have any fever or neck stiffness to suggest meningitis patient's confused state may be secondary to anticholinergic medication.  Currently, he is awake, alert, oriented  Pt states his condition has improved at this time. [Pt is AAOX3. No SI or HI. GCS is 15. Discussed with pt all pertinent ED information and results. Discussed pt dx and plan of tx. Gave pt all f/u and return to the ED instructions. All questions and concerns were addressed at this time. Pt expresses understanding of information and instructions, and is comfortable with plan to discharge. Pt is stable for discharge.    I discussed with patient and/or family/caretaker that evaluation in the ED does not suggest any emergent or life threatening medical conditions requiring immediate intervention beyond what was provided in the ED, and I believe patient is safe for discharge.  Regardless, an unremarkable evaluation in the ED does not preclude the development or presence of a serious of  life threatening condition. As such, patient was instructed to return immediately for any worsening or change in current symptoms.       Medical Decision Making:   Initial Assessment:   Patient presents to emergency department with altered mental status.  Patient was last seen on Saturday.  Patient was found via EMS in clean clothes, no urinary/bowel incontinence, no drug paraphernalia.  Patient was confused.  Patient did have improvement of his level of consciousness.  Patient was complaining of chest pain and headache with have been constant for several weeks duration .  Differential Diagnosis:   Drug intoxication, hypoglycemia, intracranial bleed, atypical ACS, dissection stress headaches malingering,  Independently Interpreted Test(s):   I have ordered and independently interpreted EKG Reading(s) - see prior notes  Clinical Tests:   Lab Tests: Ordered and Reviewed  Radiological Study: Ordered and Reviewed  Medical Tests: Ordered and Reviewed  ED Management:  Patient underwent head CT which was negative for any intracranial pathology.  Patient had improvement in his level of consciousness undergoing serial neurologic exams.  IH avail, patient appeared to be more awake, alert, and cooperative.  Patient had been complaining of chest pain that has been continuous for several weeks duration.  Patient does smoke.  Patient has been noncompliant with his PPI.  Patient denies any known history of coronary artery disease, diabetes, for family history of coronary artery disease premature.  Patient underwent serial EKGs which did not show any dynamic changes.  Patient underwent serial troponins which were negative as well.  Patient did have relief of his symptoms with GI cocktail.  Regarding his headache some a patient's head CT did not show any intracranial pathology.  Patient has been under significant stress as he is  from his wife currently.  There is no meningismus on exam.  Clinical findings do not suggest  meningitis necessitating LC.  During the visit in the ED, patient became awake, alert, oriented and cooperative.  Patient had been overtaking medications-patient had taken 4 tablets of ibuprofen p.m..  Patient denies any suicidal ideation or homicidal ideation.  Patient was counseled on taking medications appropriately.  Patient also was counseled on avoiding nonsteroidal anti-inflammatories.  Patient was evaluated by on-call tele psychiatry who did not recommend pec.  Patient prior to discharge was awake, alert, oriented.  He was cooperative.  No signs of meningismus.  Patient will follow-up with GI and Cardiology.  All questions answered.     Additional MDM:   Smoking Cessation: The patient is a smoker. The patient was counseled on smoking cessation for: 3 minutes. The patient was counseled on tobacco related  health complications.        ED Medication(s):  Medications   GI cocktail (mylanta 30 mL, lidocaine 2 % viscous 10 mL, dicyclomine 10 mL) 50 mL (50 mLs Oral Given 9/9/20 1351)   pantoprazole injection 40 mg (40 mg Intravenous Given 9/9/20 1351)   iohexoL (OMNIPAQUE 350) injection 100 mL (100 mLs Intravenous Given 9/9/20 1425)       Discharge Medication List as of 9/9/2020  4:09 PM      START taking these medications    Details   butalbital-acetaminophen-caffeine -40 mg (FIORICET, ESGIC) -40 mg per tablet Take 1 tablet by mouth every 6 (six) hours as needed for Pain., Starting Wed 9/9/2020, Until Fri 10/9/2020, Print      hydrOXYzine pamoate (VISTARIL) 25 MG Cap Take 1 capsule (25 mg total) by mouth every 6 (six) hours as needed (anxiety)., Starting Wed 9/9/2020, Print      pantoprazole (PROTONIX) 40 MG tablet Take 1 tablet (40 mg total) by mouth once daily., Starting Wed 9/9/2020, Until Fri 10/9/2020, Print             Follow-up Information     Makenna Brower MD In 2 days.    Specialty: Cardiology  Why: Return to the ED for:   worsening chest pain, chest pressure, shortness of breath, suicidal  thoughts, or other concerns.  Contact information:  [address]  [place] [address]  [phone]             Princess Griggs MD In 2 days.    Specialty: Gastroenterology  Why: (GI) for GERD  Contact information:  [address]  [place] [address]  [phone]                       Scribe Attestation:   Scribe #1: I performed the above scribed service and the documentation accurately describes the services I performed. I attest to the accuracy of the note.     Attending:   Physician Attestation Statement for Scribe #1: I, Marzena Fan DO, personally performed the services described in this documentation, as scribed by Lupis García, in my presence, and it is both accurate and complete.           Clinical Impression       ICD-10-CM ICD-9-CM   1. Tobacco abuse  Z72.0 305.1   2. Altered mental status  R41.82 780.97   3. Chest pain  R07.9 786.50   4. Atypical chest pain  R07.89 786.59   5. Adjustment disorder, unspecified type  F43.20 309.9       Disposition:   Disposition: Discharged  Condition: Stable         Marzena Fan DO  09/09/20 [id number]

## 2020-09-11 ENCOUNTER — TELEPHONE (OUTPATIENT)
Dept: EMERGENCY MEDICINE | Facility: HOSPITAL | Age: 50
End: 2020-09-11

## 2020-09-12 LAB
BACTERIA BLD CULT: ABNORMAL